# Patient Record
Sex: FEMALE | Race: WHITE | NOT HISPANIC OR LATINO | Employment: FULL TIME | URBAN - METROPOLITAN AREA
[De-identification: names, ages, dates, MRNs, and addresses within clinical notes are randomized per-mention and may not be internally consistent; named-entity substitution may affect disease eponyms.]

---

## 2019-07-29 ENCOUNTER — OFFICE VISIT (OUTPATIENT)
Dept: URGENT CARE | Facility: PHYSICIAN GROUP | Age: 64
End: 2019-07-29
Payer: COMMERCIAL

## 2019-07-29 VITALS
OXYGEN SATURATION: 93 % | TEMPERATURE: 96.6 F | HEIGHT: 60 IN | RESPIRATION RATE: 20 BRPM | WEIGHT: 290 LBS | BODY MASS INDEX: 56.93 KG/M2 | DIASTOLIC BLOOD PRESSURE: 74 MMHG | SYSTOLIC BLOOD PRESSURE: 124 MMHG | HEART RATE: 86 BPM

## 2019-07-29 DIAGNOSIS — J42 CHRONIC BRONCHITIS WITH ACUTE EXACERBATION (HCC): ICD-10-CM

## 2019-07-29 DIAGNOSIS — J20.9 CHRONIC BRONCHITIS WITH ACUTE EXACERBATION (HCC): ICD-10-CM

## 2019-07-29 PROCEDURE — 99204 OFFICE O/P NEW MOD 45 MIN: CPT | Performed by: PHYSICIAN ASSISTANT

## 2019-07-29 RX ORDER — LEVOFLOXACIN 500 MG/1
500 TABLET, FILM COATED ORAL DAILY
COMMUNITY

## 2019-07-29 RX ORDER — IPRATROPIUM BROMIDE AND ALBUTEROL SULFATE 2.5; .5 MG/3ML; MG/3ML
3 SOLUTION RESPIRATORY (INHALATION) ONCE
Status: COMPLETED | OUTPATIENT
Start: 2019-07-29 | End: 2019-07-29

## 2019-07-29 RX ORDER — LEVOTHYROXINE SODIUM 0.07 MG/1
TABLET ORAL
Refills: 3 | COMMUNITY
Start: 2019-06-23

## 2019-07-29 RX ORDER — GUAIFENESIN 200 MG/1
200 TABLET ORAL EVERY 6 HOURS PRN
Qty: 30 TAB | Refills: 0 | Status: SHIPPED | OUTPATIENT
Start: 2019-07-29

## 2019-07-29 RX ORDER — FEXOFENADINE HCL 60 MG/1
60 TABLET, FILM COATED ORAL DAILY
COMMUNITY

## 2019-07-29 RX ORDER — DOXYCYCLINE HYCLATE 100 MG
100 TABLET ORAL 2 TIMES DAILY
Qty: 14 TAB | Refills: 0 | Status: SHIPPED | OUTPATIENT
Start: 2019-07-29 | End: 2019-08-05

## 2019-07-29 RX ADMIN — IPRATROPIUM BROMIDE AND ALBUTEROL SULFATE 3 ML: 2.5; .5 SOLUTION RESPIRATORY (INHALATION) at 19:33

## 2019-07-29 ASSESSMENT — ENCOUNTER SYMPTOMS
HEADACHES: 0
CHILLS: 0
EYE REDNESS: 0
SINUS PAIN: 1
EYE PAIN: 0
CONSTIPATION: 0
ABDOMINAL PAIN: 0
FEVER: 0
SORE THROAT: 0
DIARRHEA: 0
NAUSEA: 0
EYE DISCHARGE: 0
MYALGIAS: 0
WHEEZING: 1
RHINORRHEA: 0
SHORTNESS OF BREATH: 0
COUGH: 1
SPUTUM PRODUCTION: 1
VOMITING: 0

## 2019-07-30 NOTE — PROGRESS NOTES
Subjective:   Angelina Ventura is a 63 y.o. female who presents for Shortness of Breath ([id number])       Patient presents today for cough for 1 week. She states that she was seen last week at an urgent care in [place] where she was given breathing treatment and levaquin. She states that she feels better overall, but is concerned as her sputum seems thicker today.      Cough   This is a new problem. Episode onset: 1 week ago. The problem has been waxing and waning. The problem occurs every few minutes. The cough is productive of sputum. Associated symptoms include nasal congestion and wheezing. Pertinent negatives include no chest pain, chills, ear congestion, ear pain, eye redness, fever, headaches, myalgias, rhinorrhea, sore throat or shortness of breath. Treatments tried: Breo inhaler, levaquin. The treatment provided moderate relief. History of chronic bronchitis     Review of Systems   Constitutional: Negative for chills and fever.   HENT: Positive for congestion and sinus pain. Negative for ear discharge, ear pain, rhinorrhea and sore throat.    Eyes: Negative for pain, discharge and redness.   Respiratory: Positive for cough, sputum production and wheezing. Negative for shortness of breath.    Cardiovascular: Negative for chest pain.   Gastrointestinal: Negative for abdominal pain, constipation, diarrhea, nausea and vomiting.   Musculoskeletal: Negative for myalgias.   Neurological: Negative for headaches.       PMH:  has no past medical history on file.    MEDS:   Current Outpatient Prescriptions:   •  BREO ELLIPTA 200-25 MCG/INH AEROSOL POWDER, BREATH ACTIVATED, Inhale 1 Puff by mouth., Disp: , Rfl: 11  •  levothyroxine (SYNTHROID) 75 MCG Tab, TAKE 1 TABLET BY MOUTH ONCE DAILY FOR 90 DAYS, Disp: , Rfl: 3  •  levoFLOXacin (LEVAQUIN) 500 MG tablet, Take 500 mg by mouth every day., Disp: , Rfl:   •  IODINE STRONG EX, by Apply externally route., Disp: , Rfl:   •  fexofenadine (ALLEGRA) 60 MG Tab, Take 60 mg  by mouth every day., Disp: , Rfl:   •  guaifenesin 200 MG tablet, Take 1 Tab by mouth every 6 hours as needed., Disp: 30 Tab, Rfl: 0  •  doxycycline (VIBRAMYCIN) 100 MG Tab, Take 1 Tab by mouth 2 times a day for 7 days., Disp: 14 Tab, Rfl: 0    ALLERGIES:   Allergies   Allergen Reactions   • Prednisone Anxiety     Pt states she gets suicidal while on medication       SURGHX: History reviewed. No pertinent surgical history.    SOCHX:  reports that she has never smoked. She has never used smokeless tobacco. She reports that she drinks alcohol.    FH: Reviewed with patient, not pertinent to this visit.     Objective:   /74   Pulse 86   Temp 35.9 °C (96.6 °F) (Temporal)   Resp 20   Ht 1.524 m (5')   Wt (!) 131.5 kg (290 lb)   SpO2 93%   BMI 56.64 kg/m²   Physical Exam   Constitutional: She is oriented to person, place, and time. She appears well-developed and well-nourished. No distress.   HENT:   Head: Normocephalic and atraumatic.   Right Ear: Tympanic membrane, external ear and ear canal normal.   Left Ear: Tympanic membrane, external ear and ear canal normal.   Nose: Mucosal edema present. Right sinus exhibits no maxillary sinus tenderness and no frontal sinus tenderness. Left sinus exhibits maxillary sinus tenderness. Left sinus exhibits no frontal sinus tenderness.   Mouth/Throat: Uvula is midline, oropharynx is clear and moist and mucous membranes are normal.   Eyes: Pupils are equal, round, and reactive to light. Conjunctivae and EOM are normal.   Neck: Normal range of motion. Neck supple. No tracheal deviation present.   Cardiovascular: Normal rate, regular rhythm and normal heart sounds.    Pulmonary/Chest: Effort normal. No respiratory distress. She has wheezes (Improved with DuoNeb). She has no rhonchi. She has no rales.   Musculoskeletal:   ROM normal all four extremities   Lymphadenopathy:     She has no cervical adenopathy.   Neurological: She is alert and oriented to person, place, and time.    Skin: Skin is warm and dry.   Psychiatric: She has a normal mood and affect. Her behavior is normal. Judgment and thought content normal.   Vitals reviewed.      Assessment/Plan:   1. Chronic bronchitis with acute exacerbation (HCC)  - ipratropium-albuterol (DUONEB) nebulizer solution; 3 mL by Nebulization route Once.  - guaifenesin 200 MG tablet; Take 1 Tab by mouth every 6 hours as needed.  Dispense: 30 Tab; Refill: 0  - doxycycline (VIBRAMYCIN) 100 MG Tab; Take 1 Tab by mouth 2 times a day for 7 days.  Dispense: 14 Tab; Refill: 0    Other orders  - BREO ELLIPTA 200-25 MCG/INH AEROSOL POWDER, BREATH ACTIVATED; Inhale 1 Puff by mouth.; Refill: 11  - levothyroxine (SYNTHROID) 75 MCG Tab; TAKE 1 TABLET BY MOUTH ONCE DAILY FOR 90 DAYS; Refill: 3  - levoFLOXacin (LEVAQUIN) 500 MG tablet; Take 500 mg by mouth every day.  - IODINE STRONG EX; by Apply externally route.  - fexofenadine (ALLEGRA) 60 MG Tab; Take 60 mg by mouth every day.    - Advised to continue breo ellipta inhaler daily  - Advised to take abx with food/yogurt and to complete course  - Strict ER precautions given  - Advised to follow up with PCP    Differential diagnosis, natural history, supportive care, and indications for immediate follow-up discussed.

## 2019-09-07 ENCOUNTER — OFFICE VISIT (OUTPATIENT)
Dept: URGENT CARE | Facility: PHYSICIAN GROUP | Age: 64
End: 2019-09-07
Payer: COMMERCIAL

## 2019-09-07 VITALS
BODY MASS INDEX: 55.95 KG/M2 | RESPIRATION RATE: 14 BRPM | DIASTOLIC BLOOD PRESSURE: 58 MMHG | HEIGHT: 60 IN | SYSTOLIC BLOOD PRESSURE: 100 MMHG | OXYGEN SATURATION: 97 % | HEART RATE: 71 BPM | WEIGHT: 285 LBS | TEMPERATURE: 97 F

## 2019-09-07 DIAGNOSIS — J42 CHRONIC BRONCHITIS WITH ACUTE EXACERBATION (HCC): ICD-10-CM

## 2019-09-07 DIAGNOSIS — J45.40 MODERATE PERSISTENT ASTHMA, UNSPECIFIED WHETHER COMPLICATED: ICD-10-CM

## 2019-09-07 DIAGNOSIS — J20.9 CHRONIC BRONCHITIS WITH ACUTE EXACERBATION (HCC): ICD-10-CM

## 2019-09-07 PROCEDURE — 99214 OFFICE O/P EST MOD 30 MIN: CPT | Performed by: PHYSICIAN ASSISTANT

## 2019-09-07 RX ORDER — AZITHROMYCIN 250 MG/1
TABLET, FILM COATED ORAL
Qty: 6 TAB | Refills: 0 | Status: SHIPPED | OUTPATIENT
Start: 2019-09-07

## 2019-09-07 RX ORDER — BENZONATATE 100 MG/1
100 CAPSULE ORAL 3 TIMES DAILY PRN
Qty: 30 CAP | Refills: 0 | Status: SHIPPED | OUTPATIENT
Start: 2019-09-07

## 2019-09-07 ASSESSMENT — ENCOUNTER SYMPTOMS
FEVER: 0
WHEEZING: 0
MYALGIAS: 0
SORE THROAT: 0
CHILLS: 0
COUGH: 1
SHORTNESS OF BREATH: 0

## 2019-09-07 ASSESSMENT — COPD QUESTIONNAIRES: COPD: 1

## 2019-09-07 NOTE — PROGRESS NOTES
Subjective:   Angelina Ventura is a 63 y.o. female who presents for Cough (mucus yellow phlems less than week )        Patient with medical history of chronic bronchitis and asthma presents complaining of worsening cough for the past 7 days.  She is concerned because she is now coughing up thick green mucus.  She has had similar symptoms in the past that ultimately led to a bacterial infection.  Last exacerbation was in July.  She was treated with doxycycline at the time.  Symptoms completely resolved.  Prior to treatment with Doxy she was on Levaquin and this did not improve her symptoms.  Breo daily, proair as needed.    Cough   This is a new problem. The current episode started in the past 7 days. The problem has been gradually worsening. The problem occurs constantly. The cough is productive of purulent sputum. Associated symptoms include nasal congestion and postnasal drip. Pertinent negatives include no chills, ear congestion, ear pain, fever, myalgias, rash, sore throat, shortness of breath or wheezing. Nothing aggravates the symptoms. She has tried a beta-agonist inhaler and steroid inhaler (mucinex) for the symptoms. The treatment provided mild relief. Her past medical history is significant for asthma, bronchitis and COPD. There is no history of emphysema, environmental allergies or pneumonia.     Review of Systems   Constitutional: Negative for chills and fever.   HENT: Positive for postnasal drip. Negative for ear pain and sore throat.    Respiratory: Positive for cough. Negative for shortness of breath and wheezing.    Musculoskeletal: Negative for myalgias.   Skin: Negative for rash.   Endo/Heme/Allergies: Negative for environmental allergies.       PMH:  has a past medical history of Asthma.  MEDS:   Current Outpatient Medications:   •  azithromycin (ZITHROMAX) 250 MG Tab, Take 2 tablets by mouth on day one. Take one tablet by mouth the remaining days until gone, Disp: 6 Tab, Rfl: 0  •  benzonatate  (TESSALON) 100 MG Cap, Take 1 Cap by mouth 3 times a day as needed., Disp: 30 Cap, Rfl: 0  •  BREO ELLIPTA 200-25 MCG/INH AEROSOL POWDER, BREATH ACTIVATED, Inhale 1 Puff by mouth., Disp: , Rfl: 11  •  levothyroxine (SYNTHROID) 75 MCG Tab, TAKE 1 TABLET BY MOUTH ONCE DAILY FOR 90 DAYS, Disp: , Rfl: 3  •  fexofenadine (ALLEGRA) 60 MG Tab, Take 60 mg by mouth every day., Disp: , Rfl:   •  guaifenesin 200 MG tablet, Take 1 Tab by mouth every 6 hours as needed., Disp: 30 Tab, Rfl: 0  •  levoFLOXacin (LEVAQUIN) 500 MG tablet, Take 500 mg by mouth every day., Disp: , Rfl:   •  IODINE STRONG EX, by Apply externally route., Disp: , Rfl:   ALLERGIES:   Allergies   Allergen Reactions   • Prednisone Anxiety     Pt states she gets suicidal while on medication     SURGHX: History reviewed. No pertinent surgical history.  SOCHX:  reports that she has never smoked. She has never used smokeless tobacco. She reports that she drinks alcohol.  FH: Family history was reviewed, no pertinent findings to report   Objective:   /58   Pulse 71   Temp 36.1 °C (97 °F) (Temporal)   Resp 14   Ht 1.524 m (5')   Wt (!) 129.3 kg (285 lb)   SpO2 97%   BMI 55.66 kg/m²   Physical Exam   Constitutional: She is oriented to person, place, and time. She appears well-developed and well-nourished.  Non-toxic appearance. No distress.   HENT:   Head: Normocephalic and atraumatic.   Right Ear: Hearing, tympanic membrane, external ear and ear canal normal.   Left Ear: Hearing, tympanic membrane, external ear and ear canal normal.   Nose: Mucosal edema and rhinorrhea present.   Mouth/Throat: Uvula is midline and mucous membranes are normal. Posterior oropharyngeal erythema present.   Eyes: Conjunctivae and lids are normal.   Neck: Neck supple.   Cardiovascular: Normal rate, regular rhythm, S1 normal, S2 normal and normal heart sounds. Exam reveals no gallop and no friction rub.   No murmur heard.  Pulmonary/Chest: Effort normal and breath sounds  normal. No respiratory distress. She has no decreased breath sounds. She has no wheezes. She has no rhonchi. She has no rales.   Abdominal: Normal appearance.   Musculoskeletal:   Normal range of motion. Exhibits no edema and no tenderness.    Neurological: She is alert and oriented to person, place, and time. No cranial nerve deficit or sensory deficit.   Skin: Skin is warm, dry and intact. Capillary refill takes less than 2 seconds.   Psychiatric: She has a normal mood and affect. Her speech is normal and behavior is normal. Judgment and thought content normal. Cognition and memory are normal.   Vitals reviewed.        Assessment/Plan:   1. Chronic bronchitis with acute exacerbation (HCC)  - azithromycin (ZITHROMAX) 250 MG Tab; Take 2 tablets by mouth on day one. Take one tablet by mouth the remaining days until gone  Dispense: 6 Tab; Refill: 0  - benzonatate (TESSALON) 100 MG Cap; Take 1 Cap by mouth 3 times a day as needed.  Dispense: 30 Cap; Refill: 0    2. Moderate persistent asthma, unspecified whether complicated    Physical exam and vital signs are reassuring.  Due to duration and worsening of symptoms patient started on azithromycin.  Patient advised that I would like her to follow-up with her pulmonologist, as this is her second exacerbation within a 2-month timeframe.    If patient develops worsening symptoms or symptoms fail to improve follow-up with PCP or return to the clinic for reevaluation.  If patient develops chest pain, shortness of breath, dyspnea, elevated fever she was instructed to go to the emergency room for reevaluation.    Differential diagnosis, natural history, supportive care, and indications for immediate follow-up discussed.

## 2021-03-03 DIAGNOSIS — Z23 NEED FOR VACCINATION: ICD-10-CM
